# Patient Record
Sex: FEMALE | Race: WHITE | Employment: STUDENT | URBAN - METROPOLITAN AREA
[De-identification: names, ages, dates, MRNs, and addresses within clinical notes are randomized per-mention and may not be internally consistent; named-entity substitution may affect disease eponyms.]

---

## 2019-07-08 ENCOUNTER — HOSPITAL ENCOUNTER (EMERGENCY)
Facility: CLINIC | Age: 19
End: 2019-07-08

## 2019-07-08 ENCOUNTER — HOSPITAL ENCOUNTER (EMERGENCY)
Facility: CLINIC | Age: 19
Discharge: HOME | End: 2019-07-08
Attending: EMERGENCY MEDICINE | Admitting: EMERGENCY MEDICINE
Payer: COMMERCIAL

## 2019-07-08 ENCOUNTER — NURSE TRIAGE (OUTPATIENT)
Dept: NURSING | Facility: CLINIC | Age: 19
End: 2019-07-08

## 2019-07-08 VITALS
RESPIRATION RATE: 14 BRPM | SYSTOLIC BLOOD PRESSURE: 129 MMHG | DIASTOLIC BLOOD PRESSURE: 92 MMHG | HEART RATE: 74 BPM | OXYGEN SATURATION: 100 % | TEMPERATURE: 98.5 F

## 2019-07-08 DIAGNOSIS — N39.0 URINARY TRACT INFECTION WITHOUT HEMATURIA, SITE UNSPECIFIED: ICD-10-CM

## 2019-07-08 LAB
ALBUMIN UR-MCNC: 30 MG/DL
APPEARANCE UR: ABNORMAL
BILIRUB UR QL STRIP: NEGATIVE
COLOR UR AUTO: ABNORMAL
GLUCOSE UR STRIP-MCNC: NEGATIVE MG/DL
HGB UR QL STRIP: ABNORMAL
HYALINE CASTS #/AREA URNS LPF: 1 /LPF (ref 0–2)
KETONES UR STRIP-MCNC: NEGATIVE MG/DL
LEUKOCYTE ESTERASE UR QL STRIP: ABNORMAL
MUCOUS THREADS #/AREA URNS LPF: PRESENT /LPF
NITRATE UR QL: NEGATIVE
PH UR STRIP: 6 PH (ref 5–7)
RBC #/AREA URNS AUTO: 33 /HPF (ref 0–2)
SOURCE: ABNORMAL
SP GR UR STRIP: 1.03 (ref 1–1.03)
SQUAMOUS #/AREA URNS AUTO: 9 /HPF (ref 0–1)
UROBILINOGEN UR STRIP-MCNC: NORMAL MG/DL (ref 0–2)
WBC #/AREA URNS AUTO: 82 /HPF (ref 0–5)

## 2019-07-08 PROCEDURE — 87491 CHLMYD TRACH DNA AMP PROBE: CPT | Performed by: EMERGENCY MEDICINE

## 2019-07-08 PROCEDURE — 81001 URINALYSIS AUTO W/SCOPE: CPT | Performed by: EMERGENCY MEDICINE

## 2019-07-08 PROCEDURE — 99283 EMERGENCY DEPT VISIT LOW MDM: CPT | Performed by: EMERGENCY MEDICINE

## 2019-07-08 PROCEDURE — 99284 EMERGENCY DEPT VISIT MOD MDM: CPT | Mod: Z6 | Performed by: EMERGENCY MEDICINE

## 2019-07-08 PROCEDURE — 87591 N.GONORRHOEAE DNA AMP PROB: CPT | Performed by: EMERGENCY MEDICINE

## 2019-07-08 RX ORDER — NORTRIPTYLINE HCL 10 MG
10-30 CAPSULE ORAL
COMMUNITY
Start: 2019-04-19

## 2019-07-08 RX ORDER — CIPROFLOXACIN 500 MG/1
500 TABLET, FILM COATED ORAL ONCE
Status: DISCONTINUED | OUTPATIENT
Start: 2019-07-08 | End: 2019-07-09 | Stop reason: HOSPADM

## 2019-07-08 RX ORDER — DESOGESTREL AND ETHINYL ESTRADIOL 0.15-0.03
1 KIT ORAL
COMMUNITY
Start: 2018-10-12

## 2019-07-08 RX ORDER — CIPROFLOXACIN 500 MG/1
500 TABLET, FILM COATED ORAL 2 TIMES DAILY
Qty: 6 TABLET | Refills: 0 | Status: SHIPPED | OUTPATIENT
Start: 2019-07-08 | End: 2019-07-11

## 2019-07-08 ASSESSMENT — ENCOUNTER SYMPTOMS
FEVER: 0
VOMITING: 0
HEMATURIA: 0
NAUSEA: 0
ABDOMINAL PAIN: 1
FLANK PAIN: 0
DYSURIA: 1

## 2019-07-08 NOTE — ED AVS SNAPSHOT
[facility], [place], Emergency Department  [address] [place] [id number]  Phone:  [phone]                                    Mary Rao   MRN: [id number]    Department:  [facility], Emergency Department   Date of Visit:  7/8/2019           After Visit Summary Signature Page    I have received my discharge instructions, and my questions have been answered. I have discussed any challenges I see with this plan with the nurse or doctor.    ..........................................................................................................................................  Patient/Patient Representative Signature      ..........................................................................................................................................  Patient Representative Print Name and Relationship to Patient    ..................................................               ................................................  Date                                   Time    ..........................................................................................................................................  Reviewed by Signature/Title    ...................................................              ..............................................  Date                                               Time          22EPIC Rev 08/18

## 2019-07-09 LAB
C TRACH DNA SPEC QL NAA+PROBE: NEGATIVE
N GONORRHOEA DNA SPEC QL NAA+PROBE: NEGATIVE
SPECIMEN SOURCE: NORMAL
SPECIMEN SOURCE: NORMAL

## 2019-07-09 NOTE — ED TRIAGE NOTES
Pt arrives with complaints of a possible UTI. Pt has pain with urination all the time which started. Denies fever, back pain, or rashes.

## 2019-07-09 NOTE — ED PROVIDER NOTES
[place] EMERGENCY DEPARTMENT ([facility])  7/08/19   History     Chief Complaint   Patient presents with     Rule out Urinary Tract Infection     The history is provided by the patient.     Mary Rao is a 18 year old otherwise healthy female who presents to the Emergency Department for evaluation of a possible UTI.  Patient reports that she has constant suprapubic abdominal pain and burning with urination.  Patient reports that her symptoms started yesterday and worsened today.  The patient does note that she had a new sexual partner prior to her symptoms starting.  They did use protection and patient is on birth control.  Patient denies any vaginal discharge, hematuria, fevers, flank pain, nausea, vomiting or rashes.  Patient does have some concern for STI and would like to be tested.  Patient reports she does believe she had a UTI once in the past, 5-6 months ago, but she was never formally diagnosed.  Patient reports that she took left over antibiotics that her friend had, and the symptoms improved at that time.  Patient reports that her LMP was approximately 3 weeks ago     I have reviewed the Medications, Allergies, Past Medical and Surgical History, and Social History in the Epic system.    History reviewed. No pertinent past medical history.    History reviewed. No pertinent surgical history.    History reviewed. No pertinent family history.    Social History     Tobacco Use     Smoking status: Never Smoker     Smokeless tobacco: Never Used   Substance Use Topics     Alcohol use: Not on file       Current Facility-Administered Medications   Medication     ciprofloxacin (CIPRO) tablet 500 mg     Current Outpatient Medications   Medication     ciprofloxacin (CIPRO) 500 MG tablet     desogestrel-ethinyl estradiol (DESOGEN) 0.15-30 MG-MCG tablet     FLUoxetine (PROZAC) 20 MG capsule     nortriptyline (PAMELOR) 10 MG capsule        Allergies   Allergen Reactions     Penicillins          Review  of Systems   Constitutional: Negative for fever.   Gastrointestinal: Positive for abdominal pain (suprapubic). Negative for nausea and vomiting.   Genitourinary: Positive for dysuria. Negative for flank pain, hematuria and vaginal discharge.   Skin: Negative for rash.   All other systems reviewed and are negative.      Physical Exam   BP: (!) 129/92  Pulse: 74  Temp: 98.5  F (36.9  C)  SpO2: 100 %      Physical Exam   Constitutional: She is oriented to person, place, and time. She appears well-developed and well-nourished.  Non-toxic appearance. She does not have a sickly appearance.   Comfortably resting, sitting in chair, NAD, nondiaphoretic, lucid, fully conversant, no  respiratory distress, alert and oriented.     HENT:   Head: Normocephalic and atraumatic.   Cardiovascular: Normal rate.   Pulmonary/Chest: Effort normal. No respiratory distress.   Abdominal: Soft. There is tenderness in the suprapubic area.   Musculoskeletal: Normal range of motion. She exhibits no edema.   Neurological: She is alert and oriented to person, place, and time.   Skin: Skin is warm and dry.       ED Course   9:41 PM  The patient was seen and examined by Marlo Gibson MD in Triage.        Procedures             Critical Care time:  none     Results for orders placed or performed during the hospital encounter of 07/08/19   UA reflex to Microscopic   Result Value Ref Range    Color Urine Dark Yellow     Appearance Urine Slightly Cloudy     Glucose Urine Negative NEG^Negative mg/dL    Bilirubin Urine Negative NEG^Negative    Ketones Urine Negative NEG^Negative mg/dL    Specific Gravity Urine 1.029 1.003 - 1.035    Blood Urine Small (A) NEG^Negative    pH Urine 6.0 5.0 - 7.0 pH    Protein Albumin Urine 30 (A) NEG^Negative mg/dL    Urobilinogen mg/dL Normal 0.0 - 2.0 mg/dL    Nitrite Urine Negative NEG^Negative    Leukocyte Esterase Urine Large (A) NEG^Negative    Source Clean catch urine     RBC Urine 33 (H) 0 - 2 /HPF    WBC Urine 82 (H)  0 - 5 /HPF    Squamous Epithelial /HPF Urine 9 (H) 0 - 1 /HPF    Mucous Urine Present (A) NEG^Negative /LPF    Hyaline Casts 1 0 - 2 /LPF               Labs Ordered and Resulted from Time of ED Arrival Up to the Time of Departure from the ED   URINE MACROSCOPIC WITH REFLEX TO MICRO - Abnormal; Notable for the following components:       Result Value    Blood Urine Small (*)     Protein Albumin Urine 30 (*)     Leukocyte Esterase Urine Large (*)     RBC Urine 33 (*)     WBC Urine 82 (*)     Squamous Epithelial /HPF Urine 9 (*)     Mucous Urine Present (*)     All other components within normal limits   CHLAMYDIA TRACHOMATIS PCR   NEISSERIA GONORRHOEAE PCR            Assessments & Plan (with Medical Decision Making)   18-year-old female patient coming into the emergency room with concerns for UTI.  She has discomfort and some pain on urination.  She states that she had similar symptoms in the past after intercourse.  Her symptoms that she is presenting with today started shortly after intercourse.  She denies any vaginal discharge and did use protection.  We did send off a urine probe for STI however it did not come back during her ED visit.  Her UA is suspicious for UTI and she will be treated with Cipro.  She should follow-up with her primary care physician if her symptoms do not improve.  Pt was discharged home/self-care.  PT was provided written discharge instructions. Additionally verbal instructions were given and discussed with patient.  PT was asked to return to the ED immediately for any new or concerning symptoms.  Pt was in agreement, endorsed understanding, and questions were answered.     I have reviewed the nursing notes.    I have reviewed the findings, diagnosis, plan and need for follow up with the patient.       Medication List      Started    ciprofloxacin 500 MG tablet  Commonly known as:  CIPRO  500 mg, Oral, 2 TIMES DAILY            Final diagnoses:   Urinary tract infection without hematuria,  "site unspecified     I, Elan Greco, am serving as a trained medical scribe to document services personally performed by Marlo Gibson MD, based on the provider's statements to me.   IMarlo MD, was physically present and have reviewed and verified the accuracy of this note documented by Elan Greco.    \"This dictation was performed with the assistance of voice recognition software and may contain inadvertant transcription  errors,  omissions and/or  inadvertent word substitution.\" --Jose Alberto Gibson MD     7/8/2019   [facility], [place], EMERGENCY DEPARTMENT     Jose Alberto Gibson MD  07/09/19 0017    "

## 2019-07-09 NOTE — DISCHARGE INSTRUCTIONS
Please make an appointment to follow up with Your Primary Care Provider as soon as possible if not improving.

## 2019-07-09 NOTE — TELEPHONE ENCOUNTER
Pt reports vaginal pain since yesterday, worsening and moderate-severe today.  She is also having dysuria and nausea.  Denies a fever, vomiting, abdominal pain.      Disposition:  See a provider within 4 hours.  She verbalized understanding and had no further questions.     Marlena Pena RN/SARAH    Reason for Disposition    [1] Vaginal or pelvic pain is constant AND [2] present > 2 hours    Additional Information    Negative: Shock suspected (e.g., cold/pale/clammy skin, too weak to stand, low BP, rapid pulse)    Negative: Sounds like a life-threatening emergency to the triager    Negative: Followed a genital area injury    Negative: Taking antibiotic for urinary tract infection (UTI)    Negative: Pregnant    Negative: Postpartum < 1 month    Negative: [1] Unable to urinate (or only a few drops) > 4 hours AND [2] bladder feels very full (e.g., palpable bladder or strong urge to urinate)    Negative: Vomiting    Negative: Patient sounds very sick or weak to the triager    Negative: [1] SEVERE pain with urination  (e.g., excruciating) AND [2] not improved after 2 hours of pain medicine and Sitz bath    Negative: Fever > 100.5 F (38.1 C)    Negative: Side (flank) or lower back pain present    Negative: Diabetes mellitus or weak immune system (e.g., HIV positive, cancer chemotherapy, transplant patient)    Negative: Bedridden (e.g., nursing home patient, CVA, chronic illness, recovering from surgery)    Negative: Artificial heart valve or artificial joint    Negative: Sounds like a life-threatening emergency to the triager    Negative: [1] Vaginal or pelvic pain AND [2] severe    Negative: Child sounds very sick or weak to the triager    Protocols used: VAGINAL SYMPTOMS OR DISCHARGE - AFTER [id number], URINATION PAIN - FEMALE-A-AH

## 2019-07-11 ENCOUNTER — NURSE TRIAGE (OUTPATIENT)
Dept: NURSING | Facility: CLINIC | Age: 19
End: 2019-07-11

## 2019-07-11 NOTE — TELEPHONE ENCOUNTER
Caller states she was started on anti-viral medication for her herpes in her vaginal area. Caller states she is having severe pain in labia area from the outbreak. Caller states she is using lidocaine, warm sitz bath and taking oral pain medication with no relief. Caller states he labia area is starting to swell. Triage guidelines recommend to see pcp within 24 hours. Caller verbalized and understands directives.    Reason for Disposition    Rash with painful tiny water blisters    Additional Information    Negative: Followed a genital area injury    Negative: Symptoms could be from sexual assault    Negative: Pain or burning with urination is main symptom    Negative: Vaginal discharge is main symptom    Negative: Pubic lice suspected    Negative: Pregnant    Negative: Patient sounds very sick or weak to the triager    Negative: [1] Genital area looks infected (e.g., draining sore, spreading redness) AND [2] fever    Negative: [1] SEVERE pain AND [2] not improved 2 hours after pain medicine    Negative: MODERATE-SEVERE itching (i.e., interferes with school, work, or sleep)    Negative: Genital area looks infected (e.g., draining sore, spreading redness)    Protocols used: VULVAR SYMPTOMS-A-AH

## 2019-07-12 ENCOUNTER — NURSE TRIAGE (OUTPATIENT)
Dept: NURSING | Facility: CLINIC | Age: 19
End: 2019-07-12

## 2019-07-13 NOTE — TELEPHONE ENCOUNTER
Patient has a question about herpes outbreak and having her period.  FNA advised good hygiene and to call back if she has develops any signs of infection.  Caller verbalizes understanding.      Additional Information    Herpes Simplex (Genital), questions about    Negative: Symptoms of a Sexually Transmitted Disease or Infection (STD, STI)    Negative: HIV exposure, questions about    Negative: Needlestick, questions about    Negative: Preventing STDs, questions about    Negative: AIDS (HIV), questions about    Negative: Bacterial Vaginosis, questions about    Negative: Chlamydia, questions about    Negative: Gonorrhea, questions about    Negative: Hepatitis B, questions about    Protocols used: STD [id number]-X-XC

## 2021-06-08 ENCOUNTER — TRANSFERRED RECORDS (OUTPATIENT)
Dept: HEALTH INFORMATION MANAGEMENT | Facility: CLINIC | Age: 21
End: 2021-06-08

## 2021-06-08 ENCOUNTER — MEDICAL CORRESPONDENCE (OUTPATIENT)
Dept: HEALTH INFORMATION MANAGEMENT | Facility: CLINIC | Age: 21
End: 2021-06-08

## 2021-06-10 ENCOUNTER — TELEPHONE (OUTPATIENT)
Dept: CONSULT | Facility: CLINIC | Age: 21
End: 2021-06-10

## 2021-06-10 ENCOUNTER — TRANSCRIBE ORDERS (OUTPATIENT)
Dept: OTHER | Age: 21
End: 2021-06-10

## 2021-06-10 DIAGNOSIS — Z82.79 FAMILY HISTORY OF MARFAN SYNDROME: Primary | ICD-10-CM

## 2021-06-10 DIAGNOSIS — R29.91 MARFANOID HABITUS: Primary | ICD-10-CM

## 2021-06-10 NOTE — TELEPHONE ENCOUNTER
Appointments scheduled.     Future Appointments   Date Time Provider Department Center   7/7/2021 12:30 PM [facility] RACHEL GENETIC COUNSELOR [facility]   7/7/2021  1:00 PM Albert Rosa MD [facility]

## 2021-06-10 NOTE — TELEPHONE ENCOUNTER
Received referral from Dr. Margarette Herrera @ [facility] for patient to be seen in Genetics for poss Marfan Syndrome/family hx of Marfan's. M for patient to call back to schedule appointment with any available Genetics MD (excluding Dr. Carreon) with GC visit prior. When patient calls back, please assist in scheduling IN PERSON appointment. Please obtain e-mail address so that photo guide/intake form can be sent. Thank you.

## 2021-07-06 ENCOUNTER — TELEPHONE (OUTPATIENT)
Dept: CONSULT | Facility: CLINIC | Age: 21
End: 2021-07-06

## 2021-07-06 NOTE — PROGRESS NOTES
Name:  Mary Rao  :   2000  MRN:   [id number]  Date of service: 2021  Referring Provider: Dr. 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  This consultation was requested by Dr. Margarette Herrera at the patient's visit on 2021.    Mary attended this visit conducted by in-person alone.    History is obtained from self and the medical record. I met with the family at the request of Dr. Rosa to obtain a personal and family history and discuss possible genetic contributions to her symptoms.    Personal History:   For additional details, review note on 2021 from Dr. Rosa.  To summarize, Mary has a history of Marfanoid habitus, dental crowding, joint pain, hypotension, palpitations, headaches, positive wrist and thumb sign, and flat feet.  Mary does not meet clinical criteria for Marfan Syndrome.    Social History  Mary recently graduated from the [employer] [facility] [employer] with a degree in [employer].  Father available for testing: Yes  Mother available for testing: Yes  Full sibling available for testing: Yes     Pregnancy/ History  Mother's age: 31 years  Father's age: 33 years  Mary was born full term via vaginal delivery  Spontaneous conception  Prenatal care was received  No pregnancy complications  No prenatal testing  No prenatal exposure and acute maternal illness during pregnancy  Complications in the  period included: Hospitalization for jaundice    Relevant Imaging    Echocardiogram    2021    ##### CONCLUSIONS ##### ECG tracing shows sinus bradycardia at 49 bpm. The aortic root at the sinus of Valsalva, sinotubular ridge and distal ascending aorta are normal. The mitral valve is normal in appearance and motion. The left and right  "ventricles have normal chamber size, wall thickness, and systolic function. The calculated biplane left ventricular ejection fraction is 64%.    Family History:   A standard three generation pedigree was obtained and is scanned into the medical record.  History pertinent to referral is underlined.    Siblings:     Full siblings: 18 y/o brother, healthy.  Reported to wear glasses, 6'1\"    Paternal:    Father: 54 y/o, healthy. Described as \"tall and lanky\" and wears glasses. Has not seen a Cardiologist.  6'1\"    Paternal grandfather: 69 y/o, reported to have Marfan Syndrome. 6'0\", wears glasses, no cardiac complications noted. Recent history of prostate cancer. Mary is not sure how her grandfather was diagnosed with Marfan Syndrome; it has just always been something that is known in the family    Paternal first cousin once removed (paternal grandfather's brother's daughter): 41 y/o, reported to have Marfan Syndrome.  No additional information.    2 female second cousins, healthy    Paternal grandmother: 70, healthy. History of breast cancer diagnosed in 60s.    Paternal aunts/uncles:     46 y/o uncle, healthy. 6'0\", no cardiac complications    7 y/o male twin cousins and 6 y/o male cousin, all healthy    34 y/o aunt, (father's paternal half sister), healthy    Maternal:     MotherDenae: 53 y/o, healthy    Maternal grandfather:  at 88 y/o d/t complications related to cancer. History of prostate cancer diagnosed in 60s    Maternal grandmother: 90, healthy    Maternal aunts/uncles:     67 y/o uncle,healthy. History of hip replacement    66 y/o uncle, healthy. History of hip replacement    65 y/o uncle, healthy. History of hip replacement    Maternal cousins: Numerous, all reported healthy    There are no additional reports of family members with history of Marfan Syndrome, ectopia lentis, sudden cardiac death, aortic enlargement, mitral valve prolapse. Paternal ancestry is of [place] descent.  Maternal ancestry " is of [place] and [place] Citizen of [place] descent.  Consanguinity is denied.     Genetic Counseling Discussion:  We reviewed Mary's family history information.  It is likely that Mary's paternal family members diagnosed with Marfan Syndrome with looser diagnostic criteria than is used today.  It is possible that these family member's do not have true Marfan Syndrome due to pathogenic variants in the FBN1 gene.  Family members reported to be affected with Marfan Syndrome could consider a genetics evaluation to define their condition and make appropriate recommendations for medical management.    We discussed that Mary does not meet clinical criteria for Marfan Syndrome today per evaluation by Dr. Rosa.  Mary had an echocardiogram obtained today which was also normal.  For these reasons, genetic testing today is not recommended for Mary.    Plan:  1. No genetic testing ordered.  2. Follow-up per recommendations of Dr. Rosa.    Valerie Mixon MS formerly [facility]  Genetic Counselor  Email: [email or web address]  Phone: [phone]  Pager: [phone]    Total Time Spent in Consultation: Approximately 25 minutes    CC: No Letter

## 2021-07-06 NOTE — TELEPHONE ENCOUNTER
Received message asking for scheduling assistance -     Britney Gao Rebecca; Nat Lee RN; Sofie Lockhart APRN CNP; P Scheduling Peds Cardiology [facility]; P Cardiology Imaging Scheduling-[facility]; 1 other             Greetings Dotty Goel is the person who does genetic scheduling.    Previous Messages    ----- Message -----   From: Susanne Swanson   Sent: 7/6/2021   1:03 PM CDT   To: LA Morgan CNP, *   Subject: RE: Needs Echo prior to Genetics appt tomorr*     Will forward on to cardio schedulers to assist! I think it's a different type of ECHO that is scheduled if the patient isn't seen a Cardiologist afterward (I usually only schedule ECHO's along with CV Genetics appt's).     Susanne   ----- Message -----   From: Nat Lee RN   Sent: 7/6/2021  10:25 AM CDT   To: LA Morgan CNP, *   Subject: RE: Needs Echo prior to Genetics appt tomorr*     I'll place that order now!   - Mariama -   ----- Message -----   From: Sofie Lockhart APRN CNP   Sent: 7/6/2021  10:17 AM CDT   To: Nat Lee RN, Susanne Swanson   Subject: Needs Echo prior to Genetics appt tomorrow, *     Hi Susanne-        Dr. Rosa would like an echocardiogram for Mary tomorrow. Would you be able to assist in getting that scheduled? Ideally would like it prior to his visit with her that starts at 1pm (7/7). Would you also be able to confirm that appt with patient.        Mariama: cc'ing you to see if you would mind putting a echo order in (FHx of Marfan syndrome is reason for eval) since you are covering for Stella and they'll likely want the order in effort to schedule. :)     Thanks!   Sofie             Mello  for patient to call [phone] to confirm if she can make echo at 9 AM on adult side.

## 2021-07-06 NOTE — PROGRESS NOTES
GENETICS CLINIC CONSULTATION / EVALUATION    Name: Mary Rao   : 2000   MRN: [id number]   Visit Date: 2021    Primary Care Provider: Virginie Jacobs   Referring Provider: Self - Referred / Margarette Herrera MD, [facility]    Mary was reviewed in Genetics Clinic in person on 2021. I was assisted in clinic today by genetic counselor Valerie Mixon, MS [facility]. She is a 20-year-old woman evaluated for possible Marfan syndrome given a suggestive family history in her paternal grandfather and great uncle. The history was obtained from the patient and from her Epic medical record.     Assessment:   Mary is a 20-year-old woman who was evaluated today for suspicion of Marfan Syndrome due to family history. On evaluation, she had some medical history (headaches, joint pain, hypotension, palpitations, dental crowding) and some physical findings (positive wrist and thumb sign, flat feet) suggestive of Marfan syndrome. However, strict diagnostic criteria has been established for Marfan syndrome, and she does not meet enough of the physical examination criteria for this disorder. An echocardiogram was obtained just after this clinic visit, and was interpreted as normal with no evidence of aortic root dilatation or mitral valve insufficiency. In the absence of any cardiac findings, ophthalmologic findings, or sufficient physical findings, she does not meet criteria for Marfan syndrome and monitoring for this disorder is not necessary.    Plan:    The medical decisions made during this visit include:  1.  We discussed the signs associated with Marfan's syndrome and told her that this diagnosis was unlikely.  She was encouraged to follow up if she had further questions or if new, worrisome signs were to develop.     ------------------------------  Family History:   A more detailed pedigree and family history can be found in note by Valerie Mixon GC.  Mary understood that 2 older  members of her family carries a diagnosis of Marfan syndrome, including her paternal grandfather and paternal great uncle.  On further review, it is likely that the diagnosis of Marfan syndrome was suggested based on much looser diagnostic criteria used widely many decades ago.    Social History:   She just graduated from the [employer] with a degree in Human Resources, and is looking for a job currently.     PMH: Pregnancy/ History  Pregnancy/ History  Mother's age: 31 years  Father's age: 33 years  Mary was born full term via vaginal delivery  Spontaneous conception  Prenatal care was received.  No pregnancy complications  No prenatal testing  No prenatal exposure and acute maternal illness during pregnancy  Complications in the  period included: Hospitalization for jaundice    PMH: Development  Normal development.     Past Medical History:   Migraines  Anxiety   Depression    History of Present Illness:   Mary presents for evaluation of Marfan Syndrome. She recently found out that her grandfather and great uncle were diagnosed with Marfan Syndrome, brought this up with her primary care doctor who, given this family history as well has her tall and lanky stature, recommended that she schedule an appointment with Genetics to be evaluated for this. She is otherwise healthy.     Review of Symptoms specific for Marfan Syndrome as noted:     Neuro Currently undergoing testing for ADD, history of migraine headaches   Psychiatric Treated and stable anxiety and depression   Eyes History of light-headedness and syncope.    Dental History of dental crowding - several lower teeth removed.    CV History of recurrent palpitations starting in middle school. Less frequent now.    Resp No history of spontaneous pneumothorax   GI Some abdominal pain.     No history of uterine rupture, unexplained pelvic floor and/or uterine prolapse   Musculoskeletal Flat feet.   No history of scoliosis, pectus  "excavatum or carinatum, chest asymmetry,  flat feet, joint pain, or functional limitations, leg fatigue, and muscle cramps, history of joint dislocations, recurrent bone fractures, recurrent sprains, congenital hip dislocation, club feet, tendon/ muscle rupture   Skin No history of unusually soft or velvety skin, Skin fragility (or traumatic splitting), unexplained skin stretch marks, thin, translucent skin with increased venous visibility, acrogeria, atrophic scars   Hematologic Bruises very easily. History of unexplained bruises.    Rheum/ ID/ Immune No concerns reported     Review of Systems:   Review Of Systems  Skin: negative, positive for frequent and easy bruising  Eyes: negative  Ears/Nose/Throat: history of dental crowding  Respiratory: No shortness of breath, dyspnea on exertion, cough, or hemoptysis  Cardiovascular: palpitations and syncope or near-syncope  Gastrointestinal: negative  Genitourinary: negative  Musculoskeletal: negative  Neurologic: migraine headaches and syncope  Psychiatric: negative, anxiety and depression stable  Hematologic/Lymphatic/Immunologic: negative  Endocrine: negative    Medications:   Current Outpatient Medications   Medication     desogestrel-ethinyl estradiol (DESOGEN) 0.15-30 MG-MCG tablet     FLUoxetine (PROZAC) 20 MG capsule     Multiple Vitamins-Minerals (ADULT ONE DAILY GUMMIES) CHEW     nortriptyline (PAMELOR) 10 MG capsule     No current facility-administered medications for this visit.      Allergies:    Allergies   Allergen Reactions     Penicillins Hives     Physical Examination:  /70 (BP Location: Right arm, Patient Position: Chair)   Pulse 80   Resp 16   Ht 5' 10.67\" (179.5 cm)   Wt 123 lb 3.8 oz (55.9 kg)   HC 55.7 cm (21.93\")   BMI 17.35 kg/m      General: WDWN in NAD, appears stated age, non-dysmorphic  Head and Face: NCAT  Ears: Well-formed, normal in position and placement, canals patent  Eyes: Normal in position and placement, EOMI; lids, " lashes, and brows unremarkable, no wide spaced eyes, no blue or dusky sclera, no ectopia lentis  Nose: Nares patent  Mouth/Throat: Lips, philtrum, palate, dentition unremarkable, no bifid uvula  Neck: No pits, tags, fissures  Chest: Symmetric  Respiratory: Clear to auscultation bilaterally  Cardiovascular: Regular rate and rhythm with no murmur  Abdomen: Nondistended, soft, nontender, no hepatosplenomegaly  Genitourinary: not conducted  Neurologic: Mental status appropriate for age; good tone, strength, and muscle bulk  Skin: Unremarkable, no soft, velvety or translucent skin. No dystrophic scars    Feature Value Enter Value if   Feature is Present   Wrist AND thumb sign 3 3   Wrist OR thumb sign 1 0   Pectus carinatum deformity 2 0   Pectus excavatum or chest asymmetry 1 0   Hindfoot deformity 2 0   Plain flat foot (pes planus) 1 1   Pneumothorax 2 0   Dural ectasia 2 0   Protrusio acetabulae 2 0   Reduced upper segment / lower segment AND increased arm span/height ratios    Upper/Lower Segment Ratio < 0.85 in whites, <0.78 in blacks AND Increased Arm Span/Height > 1.05 contributes 1 point to the systemic score. 1 1    AS:198  LS:87  US: 92    U/L: 1.05  AS/H: 1.1   Scoliosis or thoracolumbar kyphosis 1 0   Reduced elbow extension 1 0   3 of 5 facial features    Dolichocephaly   Downward slanting palpebral fissures   Enophthalmos   Retrognathia   Malar hypoplasia 1 0   Skin striae   (uncommon location, such as the mid-back, lumbar region, the upper arm, axillary region or thigh) 1 0   Severe Myopia 1 0   Mitral valve prolapse 1 0   Total  5   *A score of ? 7 is considered a positive systemic score.     Imaging Results:   Echocardiogram  7/7/2021    ECG tracing shows sinus bradycardia at 49 bpm. The aortic root at the sinus of Valsalva, sinotubular ridge and distal ascending aorta are normal. The mitral  valve is normal in appearance and motion. The left and right ventricles have normal chamber size, wall thickness,  and systolic function. The calculated  biplane left ventricular ejection fraction is 64%.    Genetic Testing Results:   none    Other Pertinent Lab Results:   none    Summary:   Mary is a 20-year-old woman who was evaluated today for Marfan syndrome. We discussed that she did not meet diagnostic criteria, but also with a negative echocardiogram, there was very low chance that she has Marfan Syndrome. She was counselled on common findings associated with connective tissue diseases, and what to watch for. All of her questions were answered, and she was advised to return to clinic if she had any further questions or if any new symptoms are to develop.      It was our pleasure to be involved in your patient's. health care. If you or the family has questions or concerns regarding these test results, please feel free to contact us.    Time:   This evaluation required 70 minutes total elapsed time for Dr. Kessler on the day of the visit including: Medical record review 30 minutes including interpretation of prior labs and records and review of the echocardiogram later in the afternoon, and 40 minutes in person with the patient. The patient was seen and discussed with Attending Dr. Albert Rosa.           Serina Snu, MS3   [facility] [employer]    I was present with the medical student for the service. I personally verified the history of present illness and performed the physical examination and medical decision making. I have verified all of the medical student s documentation for this encounter        Albert Rosa MD  Professor  [facility]  Department of Pediatrics  Division of Genetics and Metabolism      Route to: Patient Care Team:  Patient Care Team:  Virginie Jacobs MD as PCP - General (Family Practice)

## 2021-07-07 ENCOUNTER — HOSPITAL ENCOUNTER (OUTPATIENT)
Dept: CARDIOLOGY | Facility: CLINIC | Age: 21
Discharge: HOME | End: 2021-07-07
Attending: MEDICAL GENETICS | Admitting: MEDICAL GENETICS
Payer: COMMERCIAL

## 2021-07-07 ENCOUNTER — OFFICE VISIT (OUTPATIENT)
Dept: CONSULT | Facility: CLINIC | Age: 21
End: 2021-07-07
Attending: MEDICAL GENETICS
Payer: COMMERCIAL

## 2021-07-07 VITALS
WEIGHT: 123.24 LBS | HEART RATE: 80 BPM | DIASTOLIC BLOOD PRESSURE: 70 MMHG | SYSTOLIC BLOOD PRESSURE: 113 MMHG | HEIGHT: 71 IN | RESPIRATION RATE: 16 BRPM | BODY MASS INDEX: 17.25 KG/M2

## 2021-07-07 DIAGNOSIS — Z82.79 FAMILY HISTORY OF MARFAN SYNDROME: Primary | ICD-10-CM

## 2021-07-07 DIAGNOSIS — R29.91 MARFANOID HABITUS: ICD-10-CM

## 2021-07-07 DIAGNOSIS — Z71.83 ENCOUNTER FOR NONPROCREATIVE GENETIC COUNSELING: ICD-10-CM

## 2021-07-07 DIAGNOSIS — Z82.79 FAMILY HISTORY OF MARFAN SYNDROME: ICD-10-CM

## 2021-07-07 PROCEDURE — 93306 TTE W/DOPPLER COMPLETE: CPT | Mod: 26 | Performed by: PEDIATRICS

## 2021-07-07 PROCEDURE — 93306 TTE W/DOPPLER COMPLETE: CPT

## 2021-07-07 PROCEDURE — 96040 HC GENETIC COUNSELING, EACH 30 MINUTES: CPT | Performed by: GENETIC COUNSELOR, MS

## 2021-07-07 PROCEDURE — 99205 OFFICE O/P NEW HI 60 MIN: CPT | Performed by: MEDICAL GENETICS

## 2021-07-07 PROCEDURE — G0463 HOSPITAL OUTPT CLINIC VISIT: HCPCS | Mod: 25

## 2021-07-07 RX ORDER — IBUPROFEN/DIPHENHYDRAMINE CIT 200MG-38MG
TABLET ORAL
COMMUNITY

## 2021-07-07 ASSESSMENT — MIFFLIN-ST. JEOR: SCORE: 1414.87

## 2021-07-07 ASSESSMENT — PATIENT HEALTH QUESTIONNAIRE - PHQ9: SUM OF ALL RESPONSES TO PHQ QUESTIONS 1-9: 8

## 2021-07-07 ASSESSMENT — PAIN SCALES - GENERAL: PAINLEVEL: NO PAIN (0)

## 2021-07-07 NOTE — PATIENT INSTRUCTIONS
Genetics  [facility]     Contact our nurse care coordinator Claire POLLOCKN, RN, PHN at [phone] or send a [employer] message for any non-urgent general or medical questions.     If you had genetic testing and have further questions, please contact the genetic counselor:    Valerie Mixon  Ph: [phone]    To schedule appointments:  Pediatric Call Center for [facility]: [phone]  Neuropsychology [phone]  Radiology/ Imaging/Echocardiogram: [phone]   Services:   [phone]     You should receive a phone call about your next appointment. If you do not receive this within two weeks of your visit, please call [phone].     If you have not already done so consider signing up for [employer] by speaking with the person at the  on your way out or go to [email or web address] to sign up online.     Sign[employer] enables easy and confidential communication with your care team.

## 2021-07-07 NOTE — LETTER
Date:July 19, 2021      Provider requested that no letter be sent. Do not send.       [facility]

## 2021-07-07 NOTE — LETTER
2021      RE: Mary Rao  [address]  [place] [address]       GENETICS CLINIC CONSULTATION / EVALUATION    Name: Mary Rao   : 2000   MRN: [id number]   Visit Date: 2021    Primary Care Provider: Virginie Jacobs   Referring Provider: Self - Referred / Margarette Herrera MD, [facility]    Mary was reviewed in Genetics Clinic in person on 2021. I was assisted in clinic today by genetic counselor Valerie Mixon, MS [facility]. She is a 20-year-old woman evaluated for possible Marfan syndrome given a suggestive family history in her paternal grandfather and great uncle. The history was obtained from the patient and from her [facility] medical record.     Assessment:   Mary is a 20-year-old woman who was evaluated today for suspicion of Marfan Syndrome due to family history. On evaluation, she had some medical history (headaches, joint pain, hypotension, palpitations, dental crowding) and some physical findings (positive wrist and thumb sign, flat feet) suggestive of Marfan syndrome. However, strict diagnostic criteria has been established for Marfan syndrome, and she does not meet enough of the physical examination criteria for this disorder. An echocardiogram was obtained just after this clinic visit, and was interpreted as normal with no evidence of aortic root dilatation or mitral valve insufficiency. In the absence of any cardiac findings, ophthalmologic findings, or sufficient physical findings, she does not meet criteria for Marfan syndrome and monitoring for this disorder is not necessary.    Plan:    The medical decisions made during this visit include:  1.  We discussed the signs associated with Marfan's syndrome and told her that this diagnosis was unlikely.  She was encouraged to follow up if she had further questions or if new, worrisome signs were to develop.     ------------------------------  Family History:   A more  detailed pedigree and family history can be found in note by Valerie Mixon GC.  Mary understood that 2 older members of her family carries a diagnosis of Marfan syndrome, including her paternal grandfather and paternal great uncle.  On further review, it is likely that the diagnosis of Marfan syndrome was suggested based on much looser diagnostic criteria used widely many decades ago.    Social History:   She just graduated from the [employer] with a degree in Human Resources, and is looking for a job currently.     PMH: Pregnancy/ History  Pregnancy/ History  Mother's age: 31 years  Father's age: 33 years  Mary was born full term via vaginal delivery  Spontaneous conception  Prenatal care was received.  No pregnancy complications  No prenatal testing  No prenatal exposure and acute maternal illness during pregnancy  Complications in the  period included: Hospitalization for jaundice    PMH: Development  Normal development.     Past Medical History:   Migraines  Anxiety   Depression    History of Present Illness:   Mary presents for evaluation of Marfan Syndrome. She recently found out that her grandfather and great uncle were diagnosed with Marfan Syndrome, brought this up with her primary care doctor who, given this family history as well has her tall and lanky stature, recommended that she schedule an appointment with Genetics to be evaluated for this. She is otherwise healthy.     Review of Symptoms specific for Marfan Syndrome as noted:     Neuro Currently undergoing testing for ADD, history of migraine headaches   Psychiatric Treated and stable anxiety and depression   Eyes History of light-headedness and syncope.    Dental History of dental crowding - several lower teeth removed.    CV History of recurrent palpitations starting in middle school. Less frequent now.    Resp No history of spontaneous pneumothorax   GI Some abdominal pain.     No history of uterine rupture,  "unexplained pelvic floor and/or uterine prolapse   Musculoskeletal Flat feet.   No history of scoliosis, pectus excavatum or carinatum, chest asymmetry,  flat feet, joint pain, or functional limitations, leg fatigue, and muscle cramps, history of joint dislocations, recurrent bone fractures, recurrent sprains, congenital hip dislocation, club feet, tendon/ muscle rupture   Skin No history of unusually soft or velvety skin, Skin fragility (or traumatic splitting), unexplained skin stretch marks, thin, translucent skin with increased venous visibility, acrogeria, atrophic scars   Hematologic Bruises very easily. History of unexplained bruises.    Rheum/ ID/ Immune No concerns reported     Review of Systems:   Review Of Systems  Skin: negative, positive for frequent and easy bruising  Eyes: negative  Ears/Nose/Throat: history of dental crowding  Respiratory: No shortness of breath, dyspnea on exertion, cough, or hemoptysis  Cardiovascular: palpitations and syncope or near-syncope  Gastrointestinal: negative  Genitourinary: negative  Musculoskeletal: negative  Neurologic: migraine headaches and syncope  Psychiatric: negative, anxiety and depression stable  Hematologic/Lymphatic/Immunologic: negative  Endocrine: negative    Medications:   Current Outpatient Medications   Medication     desogestrel-ethinyl estradiol (DESOGEN) 0.15-30 MG-MCG tablet     FLUoxetine (PROZAC) 20 MG capsule     Multiple Vitamins-Minerals (ADULT ONE DAILY GUMMIES) CHEW     nortriptyline (PAMELOR) 10 MG capsule     No current facility-administered medications for this visit.      Allergies:    Allergies   Allergen Reactions     Penicillins Hives     Physical Examination:  /70 (BP Location: Right arm, Patient Position: Chair)   Pulse 80   Resp 16   Ht 5' 10.67\" (179.5 cm)   Wt 123 lb 3.8 oz (55.9 kg)   HC 55.7 cm (21.93\")   BMI 17.35 kg/m      General: WDWN in NAD, appears stated age, non-dysmorphic  Head and Face: NCAT  Ears: " Well-formed, normal in position and placement, canals patent  Eyes: Normal in position and placement, EOMI; lids, lashes, and brows unremarkable, no wide spaced eyes, no blue or dusky sclera, no ectopia lentis  Nose: Nares patent  Mouth/Throat: Lips, philtrum, palate, dentition unremarkable, no bifid uvula  Neck: No pits, tags, fissures  Chest: Symmetric  Respiratory: Clear to auscultation bilaterally  Cardiovascular: Regular rate and rhythm with no murmur  Abdomen: Nondistended, soft, nontender, no hepatosplenomegaly  Genitourinary: not conducted  Neurologic: Mental status appropriate for age; good tone, strength, and muscle bulk  Skin: Unremarkable, no soft, velvety or translucent skin. No dystrophic scars    Feature Value Enter Value if   Feature is Present   Wrist AND thumb sign 3 3   Wrist OR thumb sign 1 0   Pectus carinatum deformity 2 0   Pectus excavatum or chest asymmetry 1 0   Hindfoot deformity 2 0   Plain flat foot (pes planus) 1 1   Pneumothorax 2 0   Dural ectasia 2 0   Protrusio acetabulae 2 0   Reduced upper segment / lower segment AND increased arm span/height ratios    Upper/Lower Segment Ratio < 0.85 in whites, <0.78 in blacks AND Increased Arm Span/Height > 1.05 contributes 1 point to the systemic score. 1 1    AS:198  LS:87  US: 92    U/L: 1.05  AS/H: 1.1   Scoliosis or thoracolumbar kyphosis 1 0   Reduced elbow extension 1 0   3 of 5 facial features    Dolichocephaly   Downward slanting palpebral fissures   Enophthalmos   Retrognathia   Malar hypoplasia 1 0   Skin striae   (uncommon location, such as the mid-back, lumbar region, the upper arm, axillary region or thigh) 1 0   Severe Myopia 1 0   Mitral valve prolapse 1 0   Total  5   *A score of ? 7 is considered a positive systemic score.     Imaging Results:   Echocardiogram  7/7/2021    ECG tracing shows sinus bradycardia at 49 bpm. The aortic root at the sinus of Valsalva, sinotubular ridge and distal ascending aorta are normal. The  mitral  valve is normal in appearance and motion. The left and right ventricles have normal chamber size, wall thickness, and systolic function. The calculated  biplane left ventricular ejection fraction is 64%.    Genetic Testing Results:   none    Other Pertinent Lab Results:   none    Summary:   Mary is a 20-year-old woman who was evaluated today for Marfan syndrome. We discussed that she did not meet diagnostic criteria, but also with a negative echocardiogram, there was very low chance that she has Marfan Syndrome. She was counselled on common findings associated with connective tissue diseases, and what to watch for. All of her questions were answered, and she was advised to return to clinic if she had any further questions or if any new symptoms are to develop.      It was our pleasure to be involved in your patient's. health care. If you or the family has questions or concerns regarding these test results, please feel free to contact us.    Time:   This evaluation required 70 minutes total elapsed time for Dr. Kessler on the day of the visit including: Medical record review 30 minutes including interpretation of prior labs and records and review of the echocardiogram later in the afternoon, and 40 minutes in person with the patient. The patient was seen and discussed with Attending Dr. Albert Rosa.           Serina Sun, MS3   [facility] [employer]    I was present with the medical student for the service. I personally verified the history of present illness and performed the physical examination and medical decision making. I have verified all of the medical student s documentation for this encounter        Albert Rosa MD  Professor  [facility]  Department of Pediatrics  Division of Genetics and Metabolism      Route to: Patient Care Team:  Patient Care Team:  Virginie Jacobs MD as PCP - General (Family Practice)

## 2021-07-07 NOTE — NURSING NOTE
"Chief Complaint   Patient presents with     Consult     Genetin/Marfan consult.     Vitals:    07/07/21 1257   BP: 113/70   BP Location: Right arm   Patient Position: Chair   Pulse: 80   Resp: 16   Weight: 123 lb 3.8 oz (55.9 kg)   Height: 5' 10.67\" (179.5 cm)   HC: 55.7 cm (21.93\")      Patient screened positive for depression but currently seeing a specialist and taking medications.    July 7, 2021       Jayne Vogt M.A.    "

## 2021-07-07 NOTE — PROGRESS NOTES
GENETICS CLINIC CONSULTATION     Name:  Mary Rao  :   2000  MRN:   [id number]  Date of service: 2021  Primary Care Provider: Virginie Jacobs MD  Referring Provider: Kalin Self    Dear Dr. Kalin Vann     We had the pleasure of seeing your patient in Genetics Clinic today.     Reason for consultation:  A consultation in the [facility] was requested for Mary, a 20 year old female, for evaluation of Marfanoid habitus Marfan syndrome.       Mary was accompanied to this visit by her {AAFAMILYMEMBERS:549750}. She also saw our {OTHER PROVIDERS:[id number]} at this visit.       History is obtained from {AAHISTORYOBTAINED:698486}.    Assessment:    Mary Rao is a 20 year old female with      The new diagnostic criteria (Copen ) puts more weight on the cardiovascular manifestations of the disorder. Aortic root aneurysm and ectopia lentis (dislocated lenses) are now cardinal features.      In the absence of any family history, the presence of these two features is sufficient for the unequivocal diagnosis of Marfan syndrome.    In the absence of one of these two cardinal features, the presence of an FBN1 mutation or positive systemic score is required.    In some cases, genetic testing can be helpful.     In the absence of family history:  1. Aortic Root Dilatation Z score ? 2 AND Ectopia Lentis = Marfan syndrome   2. Aortic Root Dilatation Z score ? 2 AND FBN1 = Marfan syndrome   3. Aortic Root Dilatation Z score ? 2 AND Systemic Score ? 7pts = Marfan syndrome   4. Ectopia lentis AND a FBN1 mutation associated with Aortic Root Dilatation = Marfan syndrome     In the presence of family history:  1. Ectopia lentis AND Family History of Marfan syndrome (as defined above) = Marfan syndrome   2. A systemic score ? 7 points AND Family History of Marfan syndrome (as defined above) = Marfan syndrome   3. Aortic Root Dilatation Z score ? 2 above 20  yrs. old, ? 3 below 20 yrs. old + Family History of Marfan syndrome (as defined above) = Marfan syndrome     Given that many manifestations of Marfan syndrome emerge with age, it is not advisable to establish definitive alternative diagnoses in individuals younger than age 20 years who have compatible but insufficient physical manifestations of Marfan syndrome.    Approximately 75% of individuals diagnosed with Marfan syndrome have an affected parent. The penetrance of FBN1 pathogenic variants is reported to be 100%; thus, offspring who inherit a FBN1 pathogenic variant from a parent will have Marfan syndrome, although the severity cannot be predicted. Few genotype-phenotype correlations exist in the Marfan syndrome; none is definitive. Identification of an FBN1 pathogenic variant in a proband thus has little prognostic value and has not been proven to reliably guide individual management. As a general rule, clinical manifestations run true within families, suggesting that the FBN1 pathogenic variant is the predominant determinant of phenotype.    Agents/Circumstances to Avoid:  Contact sports, competitive sports, and isometric exercise. Note: Individuals can and should remain active with aerobic activities performed in moderation.  Activities that cause joint injury or pain  Agents that stimulate the cardiovascular system including routine use of decongestants. Caffeine can aggravate a tendency for arrhythmia.  Agents that cause vasoconstriction, including triptans  LASIK eye surgery to correct refractive errors  For individuals at risk for recurrent pneumothorax, breathing against resistance (e.g., playing a brass instrument) or positive pressure ventilation (e.g., SCUBA diving)    Surveillance  Eye. An annual ophthalmologic examination should include a specific assessment for glaucoma and cataracts.  Skeletal. Individuals with severe or progressive scoliosis should be followed by an orthopedist.  Cardiovascular.  Echocardiography to monitor the status of the ascending aorta is indicated.   All individuals with Marfan syndrome should begin intermittent surveillance of the entire aorta with CT or MRA scans in young adulthood. Such imaging should be performed at least annually in anyone with a history of aortic root replacement or dissection.    Also recommend TARA, and homocystine level to rule out homocystinuria. Overlap with Marfan syndrome can be extensive.     Plan:    1. Ordered at this visit:   No orders of the defined types were placed in this encounter.      Plasma amino acids  Plasma homocystine    2. {Genetic testin}.   3. Genetic counseling consultation with {AAGCLISTname:280322}, MS, LGC to {AAGCCONSULTPURPOSE:747388}  4. ***Mary Rao was evaluated in a specialty clinic today at which time her ***systolic/ diastolic blood pressure was noted to be elevated. ***Parents have been advised to follow up with her primary provider or with a nurse only visit.   5. Follow up: No follow-ups on file.    References:  Https://www.ncbi.nlm.nih.gov/books/GIF1870/  https://www.marfan.org/dx/rules  -----------------------------------    History of Present Illness:  Mary Rao is a 20 year old female with    There is no problem list on file for this patient.    Birth control  prozac  Nortriptyline - migraine  Spironolactone - acne  Buspirone - anxiety    ADD  Shy -     Genetics No previous genetic testing   Neuro No history of learning disability, hyperactivity, balance problems, headaches     Psyche No history of anxiety or depression   Eyes No history of myopia/ ectopia lentis/ cataract/ glaucoma, keratoconus  *** few times of light headedness, heat, not eating. Sit down improves. Middle school. Still now. Once every few weeks //month.   Faints.    ENT No concerns reported***     Dental No history of dental crowding, gingival recession and gingival fragility, high and narrow palate    Teeth  pulled bc over crowding - frnnt bottom six. Ellery teeth.    Endo No concerns reported***   CV ECHO- Ao root dilation, MVP***. No history of chest pain or palpitations at rest. No history of vascular rupture. No history of early onset varicose veins (under age 30 and nulliparous if female)***, POTS    Heart burn. Yes Palpitation - few minutes.    Resp No history of spontaneous pneumothorax   GI No history of recurrent/ multiple abdominal hernia, intestinal rupture, diarrhea, constipation, abdominal pain, unexplained rectal prolapse.    Stomach pain - every few days. Drink more water     No history of uterine rupture***, unexplained pelvic floor and/or uterine prolapse     Msk No history of scoliosis, pectus excavatum or carinatum, chest asymmetry,  flat feet, joint pain, or functional limitations, leg fatigue, and muscle cramps, history of joint dislocations, recurrent bone fractures, recurrent sprains, congenital hip dislocation, club feet, tendon/ muscle rupture    divit . Flat feet. Arm popped socket    Skin No history of unusually soft or velvety skin, Skin fragility (or traumatic splitting), unexplained skin stretch marks, unusual skin lesions, thin, translucent skin with increased venous visibility, acrogeria, atrophic scars    Soft skin. Bruise really easily.    Heme No history of bruising unrelated to identified trauma and/or in unusual sites such as cheeks and back   Rheum/ ID/ Immune No concerns reported***     Long arms, flat feet      Developmental/Educational History:  Parental concerns: {AAYES:858420}    Gross motor:{AAgrossmotormilestones:216588}  Fine motor: {AAfinemotorskills:177108}  Language: {AAlanguageskills:531901}  Personal-Social: {AApersonalsocialskills:465340}  Cognitive: {AAcognitiveskills:168688}    School:  *** grade.   Special education: {SPECIAL EDUCATION:453839935}.  Services currently received include {SPECIAL EDUCATION CLASSIFICATION:546486655}.    Therapies/ Services received:  "{AASERVICES:634595}    Developmental regression: {AAYES:690959}    Behaviors of concern: {AAYES:696681}  Neuropsychological evaluation {AANEUROPSYCHE:197610}    : {AAYES:819659}    Review of Systems  10 point ROS is negative except as stated in HPI***    Pregnancy/ History:  Mother's age:   years  Father's age:   years  Mary was born at Gestational Age: <None>     via {aadelivery:073616}  Prenatal care {WAS/WAS NOT:726648} received.   Pregnancy complications included {AApregcomp:879560}  Prenatal testing included {AAPRENDXTEST:236681}  Prenatal exposure and acute maternal illness during pregnancy was {AAYES:662006}  The APGAR scores were  Birth Weight = 0 lbs 0 oz  Birth Length = Data Unavailable  Birth Head Circum. = Data Unavailable  Birth Discharge Wt. = 0 lbs 0 oz  Complications in the  period included: *** {AAYES:494882}    Past Medical History:  No past medical history on file.    Past Surgical History:  No past surgical history on file.    Medications:  Current Outpatient Medications   Medication Sig Dispense Refill     desogestrel-ethinyl estradiol (DESOGEN) 0.15-30 MG-MCG tablet Take 1 tablet by mouth       FLUoxetine (PROZAC) 20 MG capsule Take 20 mg by mouth       Multiple Vitamins-Minerals (ADULT ONE DAILY GUMMIES) CHEW 1 gummy daily.       nortriptyline (PAMELOR) 10 MG capsule Take 10-30 mg by mouth         Allergies:  Allergies   Allergen Reactions     Penicillins Hives       Immunization:    There is no immunization history on file for this patient.  UTD: {Yes and No:185997}    Diet:  {AADIET:176044::\"Regular\"}    Family History:    A detailed pedigree was obtained by the genetic counselor at the time of this appointment and is scanned into the electronic medical record. I personally reviewed and discussed the pedigree with the GC and the family and concur with the GC note. Please refer to the formal pedigree for full details.   No family history on file.    No family history " "of Marfan syndrome, sudden death or vascular rupture.     Father's height:  Mother's height:  Mid-parental height:     Social History:  Social History     Social History Narrative     Not on file       Lives with {aahousehold:535439}  Community resources received currently are {COMMUNITY RESOURCES:171954220}.    Physical Examination:  Blood pressure 113/70, pulse 80, resp. rate 16, height 5' 10.67\" (179.5 cm), weight 123 lb 3.8 oz (55.9 kg), head circumference 55.7 cm (21.93\").  Growth percentile SmartLinks can only be used for patients less than 20 years old.   Weight %tile:Facility age limit for growth percentiles is 20 years.  Height %tile: Facility age limit for growth percentiles is 20 years.  Head Circumference %tile: Facility age limit for growth percentiles is 20 years.  BMI %tile: Facility age limit for growth percentiles is 20 years.    Pictures taken during the visit: {AAPICTURES:256979}    General: WDWN in NAD, appears stated age, ***non-dysmorphic  Head and Face: NCAT  Ears: Well-formed, normal in position and placement, canals patent  Eyes: Normal in position and placement, EOMI; lids, lashes, and brows unremarkable, *** no wide spaced eyes, ***no blue or dusky sclera  Nose: Nares patent  Mouth/Throat: Lips, philtrum, palate, dentition unremarkable, ***no bifid uvula  Neck: No pits, tags, fissures  Chest: Symmetric, ***Raúl stage 1  Respiratory: Clear to auscultation bilaterally  Cardiovascular: Regular rate and rhythm with no murmur  Abdomen: Nondistended, soft, nontender, no hepatosplenomegaly  Genitourinary: ***Normal genitalia, Raúl stage 1  Neurologic: Mental status appropriate for age; good tone, strength, and muscle bulk  Skin: Unremarkable, ***no soft, velvety or translucent skin. No dystrophic scars    Feature Value Enter Value if   Feature is Present   Wrist AND thumb sign 3 3   Wrist OR thumb sign 1    Pectus carinatum deformity 2    Pectus excavatum or chest asymmetry 1    Hindfoot " deformity 2 2   Plain flat foot (pes planus) 1 1   Pneumothorax 2 0   Dural ectasia 2    Protrusio acetabulae 2    Reduced upper segment / lower segment AND increased arm span/height ratios    Upper/Lower Segment Ratio < 0.85 in whites, <0.78 in blacks AND Increased Arm Span/Height > 1.05 contributes 1 point to the systemic score. 1 198  78   Scoliosis or thoracolumbar kyphosis 1 0   Reduced elbow extension 1 0   3 of 5 facial features    Dolichocephaly []  Downward slanting palpebral fissures []  Enophthalmos []  Retrognathia []  Malar hypoplasia []   1    Skin striae   (uncommon location, such as the mid-back, lumbar region, the upper arm, axillary region or thigh) 1 0   Severe Myopia 1 0   Mitral valve prolapse 1    Total       *A score of ? 7 is considered a positive systemic score.    Genetic testing done to date:  ***    Pertinent lab results:   ***    Imaging results:  ***  No results found for this or any previous visit (from the past 744 hour(s)).         Thank you for allowing us to participate in the care of Mary Rao. Please do not hesitate to contact us with questions.     *** min spent on the date of the encounter in chart review, patient visit, review of tests, documentation and/or discussion with other providers about the issues documented above.           Cary Garza MD    Division of Genetics and Metabolism  Department of Pediatrics    Appt     [phone]  Nurse   [phone]           Route to  Patient Care Team:  Virginie Jacobs MD as PCP - General (Family Practice)

## 2021-07-07 NOTE — LETTER
2021      RE: Mary Rao  [address]  [place] [address]       Name:  Mary Rao  :   2000  MRN:   [id number]  Date of service: 2021  Referring Provider: Dr. Margarette Herrera    Genetic Counseling Consultation Note    Presenting Information:  A consultation in the [facility] was requested for Mary, a 20 year old female, for evaluation of Marfanoid habitus and reported paternal family history of Marfan Syndrome.  This consultation was requested by Dr. Margarette Herrera at the patient's visit on 2021.    Mary attended this visit conducted by in-person alone.    History is obtained from self and the medical record. I met with the family at the request of Dr. Rosa to obtain a personal and family history and discuss possible genetic contributions to her symptoms.    Personal History:   For additional details, review note on 2021 from Dr. Rosa.  To summarize, Mary has a history of Marfanoid habitus, dental crowding, joint pain, hypotension, palpitations, headaches, positive wrist and thumb sign, and flat feet.  Mary does not meet clinical criteria for Marfan Syndrome.    Social History  Mary recently graduated from the [employer] with a degree in HR.  Father available for testing: Yes  Mother available for testing: Yes  Full sibling available for testing: Yes     Pregnancy/ History  Mother's age: 31 years  Father's age: 33 years  Mary was born full term via vaginal delivery  Spontaneous conception  Prenatal care was received  No pregnancy complications  No prenatal testing  No prenatal exposure and acute maternal illness during pregnancy  Complications in the  period included: Hospitalization for jaundice    Relevant Imaging    Echocardiogram    2021    ##### CONCLUSIONS ##### ECG tracing shows sinus bradycardia at 49 bpm. The aortic root at the sinus of Valsalva, sinotubular ridge  "and distal ascending aorta are normal. The mitral valve is normal in appearance and motion. The left and right ventricles have normal chamber size, wall thickness, and systolic function. The calculated biplane left ventricular ejection fraction is 64%.    Family History:   A standard three generation pedigree was obtained and is scanned into the medical record.  History pertinent to referral is underlined.    Siblings:     Full siblings: 18 y/o brother, healthy.  Reported to wear glasses, 6'1\"    Paternal:    Father: 52 y/o, healthy. Described as \"tall and lanky\" and wears glasses. Has not seen a Cardiologist.  6'1\"    Paternal grandfather: 71 y/o, reported to have Marfan Syndrome. 6'0\", wears glasses, no cardiac complications noted. Recent history of prostate cancer. Mary is not sure how her grandfather was diagnosed with Marfan Syndrome; it has just always been something that is known in the family    Paternal first cousin once removed (paternal grandfather's brother's daughter): 41 y/o, reported to have Marfan Syndrome.  No additional information.    2 female second cousins, healthy    Paternal grandmother: 70, healthy. History of breast cancer diagnosed in 60s.    Paternal aunts/uncles:     44 y/o uncle, healthy. 6'0\", no cardiac complications    5 y/o male twin cousins and 6 y/o male cousin, all healthy    34 y/o aunt, (father's paternal half sister), healthy    Maternal:     MotherDenae: 53 y/o, healthy    Maternal grandfather:  at 90 y/o d/t complications related to cancer. History of prostate cancer diagnosed in 60s    Maternal grandmother: 90, healthy    Maternal aunts/uncles:     67 y/o uncle,healthy. History of hip replacement    66 y/o uncle, healthy. History of hip replacement    65 y/o uncle, healthy. History of hip replacement    Maternal cousins: Numerous, all reported healthy    There are no additional reports of family members with history of Marfan Syndrome, ectopia lentis, sudden " cardiac death, aortic enlargement, mitral valve prolapse. Paternal ancestry is of [place] descent.  Maternal ancestry is of [place] and [place] descent.  Consanguinity is denied.     Genetic Counseling Discussion:  We reviewed Mary's family history information.  It is likely that Mary's paternal family members diagnosed with Marfan Syndrome with looser diagnostic criteria than is used today.  It is possible that these family member's do not have true Marfan Syndrome due to pathogenic variants in the FBN1 gene.  Family members reported to be affected with Marfan Syndrome could consider a genetics evaluation to define their condition and make appropriate recommendations for medical management.    We discussed that Mary does not meet clinical criteria for Marfan Syndrome today per evaluation by Dr. Rosa.  Mary had an echocardiogram obtained today which was also normal.  For these reasons, genetic testing today is not recommended for Mary.    Plan:  1. No genetic testing ordered.  2. Follow-up per recommendations of Dr. Rosa.    Valerie Mixon MS [facility]  Genetic Counselor  Email: [email or web address]  Phone: [phone]  Pager: [phone]    Total Time Spent in Consultation: Approximately 25 minutes    CC: No Letter        Valerie Mixon GC

## 2021-08-15 ENCOUNTER — HEALTH MAINTENANCE LETTER (OUTPATIENT)
Age: 21
End: 2021-08-15

## 2021-10-10 ENCOUNTER — HEALTH MAINTENANCE LETTER (OUTPATIENT)
Age: 21
End: 2021-10-10

## 2022-09-18 ENCOUNTER — HEALTH MAINTENANCE LETTER (OUTPATIENT)
Age: 22
End: 2022-09-18

## 2023-10-08 ENCOUNTER — HEALTH MAINTENANCE LETTER (OUTPATIENT)
Age: 23
End: 2023-10-08